# Patient Record
Sex: MALE | Race: WHITE | NOT HISPANIC OR LATINO | RURAL
[De-identification: names, ages, dates, MRNs, and addresses within clinical notes are randomized per-mention and may not be internally consistent; named-entity substitution may affect disease eponyms.]

---

## 2017-01-02 ENCOUNTER — OFFICE VISIT (OUTPATIENT)
Dept: RETAIL CLINIC | Facility: CLINIC | Age: 22
End: 2017-01-02

## 2017-01-02 VITALS
HEIGHT: 69 IN | RESPIRATION RATE: 16 BRPM | BODY MASS INDEX: 26.75 KG/M2 | WEIGHT: 180.6 LBS | TEMPERATURE: 98.7 F | OXYGEN SATURATION: 98 % | HEART RATE: 68 BPM

## 2017-01-02 DIAGNOSIS — K29.00 ACUTE GASTRITIS WITHOUT HEMORRHAGE, UNSPECIFIED GASTRITIS TYPE: Primary | ICD-10-CM

## 2017-01-02 PROCEDURE — 99213 OFFICE O/P EST LOW 20 MIN: CPT | Performed by: NURSE PRACTITIONER

## 2017-01-02 RX ORDER — PROMETHAZINE HYDROCHLORIDE 12.5 MG/1
12.5 TABLET ORAL EVERY 6 HOURS PRN
Qty: 30 TABLET | Refills: 0 | Status: SHIPPED | OUTPATIENT
Start: 2017-01-02 | End: 2017-01-07

## 2017-01-02 NOTE — PATIENT INSTRUCTIONS
Gastritis, Adult  Gastritis is soreness and swelling (inflammation) of the lining of the stomach. Gastritis can develop as a sudden onset (acute) or long-term (chronic) condition. If gastritis is not treated, it can lead to stomach bleeding and ulcers.  CAUSES   Gastritis occurs when the stomach lining is weak or damaged. Digestive juices from the stomach then inflame the weakened stomach lining. The stomach lining may be weak or damaged due to viral or bacterial infections. One common bacterial infection is the Helicobacter pylori infection. Gastritis can also result from excessive alcohol consumption, taking certain medicines, or having too much acid in the stomach.   SYMPTOMS   In some cases, there are no symptoms. When symptoms are present, they may include:  · Pain or a burning sensation in the upper abdomen.  · Nausea.  · Vomiting.  · An uncomfortable feeling of fullness after eating.  DIAGNOSIS   Your caregiver may suspect you have gastritis based on your symptoms and a physical exam. To determine the cause of your gastritis, your caregiver may perform the following:  · Blood or stool tests to check for the H pylori bacterium.  · Gastroscopy. A thin, flexible tube (endoscope) is passed down the esophagus and into the stomach. The endoscope has a light and camera on the end. Your caregiver uses the endoscope to view the inside of the stomach.  · Taking a tissue sample (biopsy) from the stomach to examine under a microscope.  TREATMENT   Depending on the cause of your gastritis, medicines may be prescribed. If you have a bacterial infection, such as an H pylori infection, antibiotics may be given. If your gastritis is caused by too much acid in the stomach, H2 blockers or antacids may be given. Your caregiver may recommend that you stop taking aspirin, ibuprofen, or other nonsteroidal anti-inflammatory drugs (NSAIDs).  HOME CARE INSTRUCTIONS  · Only take over-the-counter or prescription medicines as directed by  your caregiver.  · If you were given antibiotic medicines, take them as directed. Finish them even if you start to feel better.  · Drink enough fluids to keep your urine clear or pale yellow.  · Avoid foods and drinks that make your symptoms worse, such as:    Caffeine or alcoholic drinks.    Chocolate.    Peppermint or mint flavorings.    Garlic and onions.    Spicy foods.    Citrus fruits, such as oranges, kimmy, or limes.    Tomato-based foods such as sauce, chili, salsa, and pizza.    Fried and fatty foods.  · Eat small, frequent meals instead of large meals.  SEEK IMMEDIATE MEDICAL CARE IF:   · You have black or dark red stools.  · You vomit blood or material that looks like coffee grounds.  · You are unable to keep fluids down.  · Your abdominal pain gets worse.  · You have a fever.  · You do not feel better after 1 week.  · You have any other questions or concerns.  MAKE SURE YOU:  · Understand these instructions.  · Will watch your condition.  · Will get help right away if you are not doing well or get worse.     This information is not intended to replace advice given to you by your health care provider. Make sure you discuss any questions you have with your health care provider.     Document Released: 12/12/2002 Document Revised: 06/18/2013 Document Reviewed: 01/30/2013  [employer] Interactive Patient Education ©2016 [employer]

## 2017-01-02 NOTE — MR AVS SNAPSHOT
Ne Sexton   1/2/2017 11:15 AM   Office Visit    Dept Phone:  [phone]   Encounter #:  [id number]    Provider:  SAYDA NUNN   Department:  [facility]                Your Full Care Plan              Today's Medication Changes          These changes are accurate as of: 1/2/17 11:36 AM.  If you have any questions, ask your nurse or doctor.               New Medication(s)Ordered:     promethazine 12.5 MG tablet   Commonly known as:  PHENERGAN   Take 1 tablet by mouth Every 6 (Six) Hours As Needed for nausea or vomiting for up to 5 days.            Where to Get Your Medications      These medications were sent to [facility]/pharmacy #[id number] - [place], [place] - [facility] [address] AT NEXT TO [facility] - [phone]  - [phone] [facility] [address] [facility] [address]     Phone:  [phone]     promethazine 12.5 MG tablet                  Your Updated Medication List          This list is accurate as of: 1/2/17 11:36 AM.  Always use your most recent med list.                promethazine 12.5 MG tablet   Commonly known as:  PHENERGAN   Take 1 tablet by mouth Every 6 (Six) Hours As Needed for nausea or vomiting for up to 5 days.               You Were Diagnosed With        Codes Comments    Acute gastritis without hemorrhage, unspecified gastritis type    -  Primary ICD-10-CM: K29.00  ICD-9-CM: 535.00       Instructions    Gastritis, Adult  Gastritis is soreness and swelling (inflammation) of the lining of the stomach. Gastritis can develop as a sudden onset (acute) or long-term (chronic) condition. If gastritis is not treated, it can lead to stomach bleeding and ulcers.  CAUSES   Gastritis occurs when the stomach lining is weak or damaged. Digestive juices from the stomach then inflame the weakened stomach lining. The stomach lining may be weak or damaged due to viral or bacterial infections. One common bacterial infection is the Helicobacter pylori infection. Gastritis can  also result from excessive alcohol consumption, taking certain medicines, or having too much acid in the stomach.   SYMPTOMS   In some cases, there are no symptoms. When symptoms are present, they may include:  · Pain or a burning sensation in the upper abdomen.  · Nausea.  · Vomiting.  · An uncomfortable feeling of fullness after eating.  DIAGNOSIS   Your caregiver may suspect you have gastritis based on your symptoms and a physical exam. To determine the cause of your gastritis, your caregiver may perform the following:  · Blood or stool tests to check for the H pylori bacterium.  · Gastroscopy. A thin, flexible tube (endoscope) is passed down the esophagus and into the stomach. The endoscope has a light and camera on the end. Your caregiver uses the endoscope to view the inside of the stomach.  · Taking a tissue sample (biopsy) from the stomach to examine under a microscope.  TREATMENT   Depending on the cause of your gastritis, medicines may be prescribed. If you have a bacterial infection, such as an H pylori infection, antibiotics may be given. If your gastritis is caused by too much acid in the stomach, H2 blockers or antacids may be given. Your caregiver may recommend that you stop taking aspirin, ibuprofen, or other nonsteroidal anti-inflammatory drugs (NSAIDs).  HOME CARE INSTRUCTIONS  · Only take over-the-counter or prescription medicines as directed by your caregiver.  · If you were given antibiotic medicines, take them as directed. Finish them even if you start to feel better.  · Drink enough fluids to keep your urine clear or pale yellow.  · Avoid foods and drinks that make your symptoms worse, such as:    Caffeine or alcoholic drinks.    Chocolate.    Peppermint or mint flavorings.    Garlic and onions.    Spicy foods.    Citrus fruits, such as oranges, kimmy, or limes.    Tomato-based foods such as sauce, chili, salsa, and pizza.    Fried and fatty foods.  · Eat small, frequent meals instead of  large meals.  SEEK IMMEDIATE MEDICAL CARE IF:   · You have black or dark red stools.  · You vomit blood or material that looks like coffee grounds.  · You are unable to keep fluids down.  · Your abdominal pain gets worse.  · You have a fever.  · You do not feel better after 1 week.  · You have any other questions or concerns.  MAKE SURE YOU:  · Understand these instructions.  · Will watch your condition.  · Will get help right away if you are not doing well or get worse.     This information is not intended to replace advice given to you by your health care provider. Make sure you discuss any questions you have with your health care provider.     Document Released: 2002 Document Revised: 2013 Document Reviewed: 2013  [employer] Interactive Patient Education © Else[employer] Inc.       Patient Instructions History      Upcoming Appointments     Visit Type Date Time Department    OFFICE VISIT 2017 11:15 AM [facility] [place]      [employer] Signup     [email or web address] allows you to send messages to your doctor, view your test results, renew your prescriptions, schedule appointments, and more. To sign up, go to [employer] and click on the Sign Up Now link in the New User? box. Enter your [employer] Activation Code exactly as it appears below along with the last four digits of your Social Security Number and your Date of Birth () to complete the sign-up process. If you do not sign up before the expiration date, you must request a new code.    [employer] Activation Code: [email or web address]  Expires: 2017 11:34 AM    If you have questions, you can email [email or web address] or call [phone] to talk to our [employer] staff. Remember, [employer] is NOT to be used for urgent needs. For medical emergencies, dial 911.               Other Info from Your Visit           Allergies     No Known Allergies      Reason for Visit     Vomiting           Vital Signs     Pulse Temperature  "Respirations Height Weight Oxygen Saturation    68 98.7 °F (37.1 °C) (Oral) 16 69\" (175.3 cm) 180 lb 9.6 oz (81.9 kg) 98%    Body Mass Index Smoking Status                26.67 kg/m2 Former Smoker          Problems and Diagnoses Noted     Inflammation of the stomach lining    -  Primary        "

## 2017-01-02 NOTE — PROGRESS NOTES
"Subjective   Ne Sexton is a 21 y.o. male.     Vomiting    This is a new problem. The current episode started yesterday. The problem occurs 5 to 10 times per day. The problem has been unchanged. The emesis has an appearance of bile and stomach contents. There has been no fever. Associated symptoms include diarrhea. Pertinent negatives include no abdominal pain, arthralgias, chest pain, chills, coughing, dizziness, fever, headaches or weight loss. He has tried increased fluids for the symptoms. The treatment provided no relief.        The following portions of the patient's history were reviewed and updated as appropriate: allergies, current medications, past medical history, past social history, past surgical history and problem list.    Review of Systems   Constitutional: Negative for appetite change, chills, fatigue, fever and weight loss.   HENT: Positive for congestion, postnasal drip and rhinorrhea. Negative for ear pain, sinus pressure, sneezing, sore throat and voice change.    Eyes: Negative.    Respiratory: Negative for cough, chest tightness, shortness of breath and wheezing.    Cardiovascular: Negative.  Negative for chest pain.   Gastrointestinal: Positive for diarrhea, nausea and vomiting. Negative for abdominal pain.   Musculoskeletal: Negative.  Negative for arthralgias.   Skin: Negative.    Neurological: Negative for dizziness and headaches.   Hematological: Negative for adenopathy.        Visit Vitals   • Pulse 68   • Temp 98.7 °F (37.1 °C) (Oral)   • Resp 16   • Ht 69\" (175.3 cm)   • Wt 180 lb 9.6 oz (81.9 kg)   • SpO2 98%   • BMI 26.67 kg/m2        Objective   Physical Exam   Constitutional: Vital signs are normal. He appears well-developed and well-nourished.   HENT:   Head: Normocephalic.   Right Ear: External ear and ear canal normal. No drainage, swelling or tenderness. Tympanic membrane is not erythematous and not bulging.   Left Ear: External ear and ear canal normal. No drainage, " swelling or tenderness. Tympanic membrane is not erythematous and not bulging.   Nose: Mucosal edema and rhinorrhea present.   Mouth/Throat: Uvula is midline, oropharynx is clear and moist and mucous membranes are normal. No oropharyngeal exudate. Tonsils are 0 on the right. Tonsils are 0 on the left. No tonsillar exudate.   Eyes: Conjunctivae and EOM are normal. Pupils are equal, round, and reactive to light. Right eye exhibits no discharge. Left eye exhibits no discharge.   Cardiovascular: Normal rate, regular rhythm, S1 normal, S2 normal and normal heart sounds.    Pulmonary/Chest: Effort normal and breath sounds normal. No respiratory distress. He has no wheezes.   Abdominal: Soft. Normal appearance. Bowel sounds are increased. There is no tenderness.   Lymphadenopathy:        Head (right side): No tonsillar adenopathy present.        Head (left side): No tonsillar adenopathy present.     He has no cervical adenopathy.   Skin: Skin is warm, dry and intact. No rash noted. He is not diaphoretic.   Psychiatric: He has a normal mood and affect. His speech is normal and behavior is normal. Thought content normal.   Vitals reviewed.      Assessment/Plan   Ne was seen today for vomiting.    Diagnoses and all orders for this visit:    Acute gastritis without hemorrhage, unspecified gastritis type  -     promethazine (PHENERGAN) 12.5 MG tablet; Take 1 tablet by mouth Every 6 (Six) Hours As Needed for nausea or vomiting for up to 5 days.

## 2017-01-02 NOTE — LETTER
January 2, 2017     Patient: Ne Sexton   YOB: 1995   Date of Visit: 1/2/2017       To Whom It May Concern:    It is my medical opinion that Ne Sexton may return to work on 1/3/17.           Sincerely,        RENO Neely      CC: No Recipients

## 2019-10-01 ENCOUNTER — IMMUNIZATION (OUTPATIENT)
Dept: RETAIL CLINIC | Facility: CLINIC | Age: 24
End: 2019-10-01

## 2019-10-01 DIAGNOSIS — Z23 NEED FOR VACCINATION: Primary | ICD-10-CM

## 2019-10-01 PROCEDURE — 90471 IMMUNIZATION ADMIN: CPT | Performed by: NURSE PRACTITIONER

## 2019-10-01 PROCEDURE — 90674 CCIIV4 VAC NO PRSV 0.5 ML IM: CPT | Performed by: NURSE PRACTITIONER

## 2022-07-19 ENCOUNTER — TELEPHONE (OUTPATIENT)
Dept: URGENT CARE | Facility: CLINIC | Age: 27
End: 2022-07-19

## 2022-07-19 DIAGNOSIS — K29.00 ACUTE SUPERFICIAL GASTRITIS WITHOUT HEMORRHAGE: Primary | ICD-10-CM
